# Patient Record
Sex: MALE | URBAN - METROPOLITAN AREA
[De-identification: names, ages, dates, MRNs, and addresses within clinical notes are randomized per-mention and may not be internally consistent; named-entity substitution may affect disease eponyms.]

---

## 2021-07-12 ENCOUNTER — OFFICE VISIT (OUTPATIENT)
Dept: URBAN - METROPOLITAN AREA CLINIC 11 | Facility: CLINIC | Age: 21
End: 2021-07-12
Payer: COMMERCIAL

## 2021-07-12 DIAGNOSIS — S05.00XA INJURY OF CONJUNCTIVA AND CORNEAL ABRASION WITHOUT FOREIGN BODY, UNSPECIFIED EYE, INITIAL ENCOUNTER: Primary | ICD-10-CM

## 2021-07-12 PROCEDURE — 99204 OFFICE O/P NEW MOD 45 MIN: CPT | Performed by: OPTOMETRIST

## 2021-07-12 RX ORDER — ERYTHROMYCIN 5 MG/G
OINTMENT OPHTHALMIC
Qty: 1 | Refills: 0 | Status: ACTIVE
Start: 2021-07-12

## 2021-07-12 RX ORDER — PREDNISOLONE ACETATE 10 MG/ML
1 % SUSPENSION/ DROPS OPHTHALMIC
Qty: 5 | Refills: 0 | Status: ACTIVE
Start: 2021-07-12

## 2021-07-12 ASSESSMENT — INTRAOCULAR PRESSURE
OD: 12
OS: 12

## 2021-07-12 NOTE — IMPRESSION/PLAN
Impression: Injury of conjunctiva and corneal abrasion without foreign body, unspecified eye, initial encounter: S05.00XA. Plan: Educated patient on exam findings, no need for irrigation today, patient successfully performed irrigation yesterday. Rx Pred Acetate QID OD, Rxs Erythromycin go TID OD, PFAT Q2hrs OD.  RTC 1wk for f/u

## 2021-07-19 ENCOUNTER — OFFICE VISIT (OUTPATIENT)
Dept: URBAN - METROPOLITAN AREA CLINIC 45 | Facility: CLINIC | Age: 21
End: 2021-07-19
Payer: COMMERCIAL

## 2021-07-19 DIAGNOSIS — H17.9 CORNEAL SCAR: ICD-10-CM

## 2021-07-19 DIAGNOSIS — S05.01XD INJURY OF CONJUNCTIVA AND CORNEAL ABRASION WITHOUT FOREIGN BODY, RIGHT EYE, SUBSEQUENT ENCOUNTER: Primary | ICD-10-CM

## 2021-07-19 PROCEDURE — 99213 OFFICE O/P EST LOW 20 MIN: CPT | Performed by: OPTOMETRIST

## 2021-07-19 ASSESSMENT — INTRAOCULAR PRESSURE
OD: 10
OS: 9

## 2021-07-19 NOTE — IMPRESSION/PLAN
Impression: Injury of conjunctiva and corneal abrasion without foreign body, right eye, subsequent encounter: [id number]. [id number]. Plan: Condition improving. Start Pred Taper TID x 1 wk, BID x 1 wk, QD x 1wk, then stop. Cont E-mycin BID 4more days then stop. Con PFAT Q2hrs x 4 days then stop.